# Patient Record
Sex: MALE | Race: WHITE | Employment: FULL TIME | URBAN - NONMETROPOLITAN AREA
[De-identification: names, ages, dates, MRNs, and addresses within clinical notes are randomized per-mention and may not be internally consistent; named-entity substitution may affect disease eponyms.]

---

## 2020-05-22 ENCOUNTER — HOSPITAL ENCOUNTER (OUTPATIENT)
Dept: MRI IMAGING | Facility: OTHER | Age: 35
End: 2020-05-22
Attending: CLINICAL NURSE SPECIALIST
Payer: COMMERCIAL

## 2020-05-22 DIAGNOSIS — R83.9 CSF ABNORMAL: ICD-10-CM

## 2020-05-22 PROCEDURE — 72156 MRI NECK SPINE W/O & W/DYE: CPT

## 2020-05-22 PROCEDURE — 70553 MRI BRAIN STEM W/O & W/DYE: CPT

## 2020-05-22 PROCEDURE — A9575 INJ GADOTERATE MEGLUMI 0.1ML: HCPCS | Performed by: RADIOLOGY

## 2020-05-22 PROCEDURE — 25500064 ZZH RX 255 OP 636: Performed by: RADIOLOGY

## 2020-05-22 RX ADMIN — GADOTERATE MEGLUMINE 20 ML: 376.9 INJECTION INTRAVENOUS at 09:16

## 2025-06-10 ENCOUNTER — OFFICE VISIT (OUTPATIENT)
Dept: FAMILY MEDICINE | Facility: OTHER | Age: 40
End: 2025-06-10
Attending: STUDENT IN AN ORGANIZED HEALTH CARE EDUCATION/TRAINING PROGRAM
Payer: COMMERCIAL

## 2025-06-10 ENCOUNTER — HOSPITAL ENCOUNTER (OUTPATIENT)
Dept: GENERAL RADIOLOGY | Facility: OTHER | Age: 40
Discharge: HOME | End: 2025-06-10
Payer: COMMERCIAL

## 2025-06-10 VITALS
HEIGHT: 70 IN | WEIGHT: 302 LBS | RESPIRATION RATE: 18 BRPM | HEART RATE: 82 BPM | SYSTOLIC BLOOD PRESSURE: 150 MMHG | TEMPERATURE: 98.5 F | DIASTOLIC BLOOD PRESSURE: 84 MMHG | BODY MASS INDEX: 43.23 KG/M2 | OXYGEN SATURATION: 96 %

## 2025-06-10 DIAGNOSIS — M79.671 PAIN OF RIGHT HEEL: ICD-10-CM

## 2025-06-10 DIAGNOSIS — M10.9 ACUTE GOUT OF RIGHT FOOT, UNSPECIFIED CAUSE: Primary | ICD-10-CM

## 2025-06-10 LAB
ALBUMIN SERPL BCG-MCNC: 4.5 G/DL (ref 3.5–5.2)
ALP SERPL-CCNC: 87 U/L (ref 40–150)
ALT SERPL W P-5'-P-CCNC: 56 U/L (ref 0–70)
ANION GAP SERPL CALCULATED.3IONS-SCNC: 11 MMOL/L (ref 7–15)
AST SERPL W P-5'-P-CCNC: 36 U/L (ref 0–45)
BASOPHILS # BLD AUTO: 0 10E3/UL (ref 0–0.2)
BASOPHILS NFR BLD AUTO: 0 %
BILIRUB SERPL-MCNC: 0.3 MG/DL
BUN SERPL-MCNC: 15.4 MG/DL (ref 6–20)
CALCIUM SERPL-MCNC: 8.8 MG/DL (ref 8.8–10.4)
CHLORIDE SERPL-SCNC: 103 MMOL/L (ref 98–107)
CREAT SERPL-MCNC: 1.2 MG/DL (ref 0.67–1.17)
CRP SERPL-MCNC: <3 MG/L
EGFRCR SERPLBLD CKD-EPI 2021: 79 ML/MIN/1.73M2
EOSINOPHIL # BLD AUTO: 0.2 10E3/UL (ref 0–0.7)
EOSINOPHIL NFR BLD AUTO: 2 %
ERYTHROCYTE [DISTWIDTH] IN BLOOD BY AUTOMATED COUNT: 12 % (ref 10–15)
GLUCOSE SERPL-MCNC: 101 MG/DL (ref 70–99)
HCO3 SERPL-SCNC: 25 MMOL/L (ref 22–29)
HCT VFR BLD AUTO: 42.4 % (ref 40–53)
HGB BLD-MCNC: 15.1 G/DL (ref 13.3–17.7)
HOLD SPECIMEN: NORMAL
IMM GRANULOCYTES # BLD: 0 10E3/UL
IMM GRANULOCYTES NFR BLD: 0 %
LYMPHOCYTES # BLD AUTO: 3.2 10E3/UL (ref 0.8–5.3)
LYMPHOCYTES NFR BLD AUTO: 34 %
MCH RBC QN AUTO: 29 PG (ref 26.5–33)
MCHC RBC AUTO-ENTMCNC: 35.6 G/DL (ref 31.5–36.5)
MCV RBC AUTO: 82 FL (ref 78–100)
MONOCYTES # BLD AUTO: 0.6 10E3/UL (ref 0–1.3)
MONOCYTES NFR BLD AUTO: 6 %
NEUTROPHILS # BLD AUTO: 5.3 10E3/UL (ref 1.6–8.3)
NEUTROPHILS NFR BLD AUTO: 57 %
NRBC # BLD AUTO: 0 10E3/UL
NRBC BLD AUTO-RTO: 0 /100
PLATELET # BLD AUTO: 298 10E3/UL (ref 150–450)
POTASSIUM SERPL-SCNC: 4.2 MMOL/L (ref 3.4–5.3)
PROT SERPL-MCNC: 6.8 G/DL (ref 6.4–8.3)
RBC # BLD AUTO: 5.2 10E6/UL (ref 4.4–5.9)
SODIUM SERPL-SCNC: 139 MMOL/L (ref 135–145)
URATE SERPL-MCNC: 7.1 MG/DL (ref 3.4–7)
WBC # BLD AUTO: 9.4 10E3/UL (ref 4–11)

## 2025-06-10 PROCEDURE — 85025 COMPLETE CBC W/AUTO DIFF WBC: CPT | Mod: ZL

## 2025-06-10 PROCEDURE — 84550 ASSAY OF BLOOD/URIC ACID: CPT | Mod: ZL

## 2025-06-10 PROCEDURE — 86140 C-REACTIVE PROTEIN: CPT | Mod: ZL

## 2025-06-10 PROCEDURE — 36415 COLL VENOUS BLD VENIPUNCTURE: CPT | Mod: ZL

## 2025-06-10 PROCEDURE — 80053 COMPREHEN METABOLIC PANEL: CPT | Mod: ZL

## 2025-06-10 PROCEDURE — 73650 X-RAY EXAM OF HEEL: CPT | Mod: RT

## 2025-06-10 PROCEDURE — 73650 X-RAY EXAM OF HEEL: CPT | Mod: 26 | Performed by: STUDENT IN AN ORGANIZED HEALTH CARE EDUCATION/TRAINING PROGRAM

## 2025-06-10 RX ORDER — PREDNISONE 20 MG/1
40 TABLET ORAL DAILY
Qty: 10 TABLET | Refills: 0 | Status: SHIPPED | OUTPATIENT
Start: 2025-06-10 | End: 2025-06-15

## 2025-06-10 ASSESSMENT — PAIN SCALES - GENERAL: PAINLEVEL_OUTOF10: MODERATE PAIN (6)

## 2025-06-10 NOTE — PROGRESS NOTES
ASSESSMENT/PLAN:    I have reviewed the nursing notes.  I have reviewed the findings, diagnosis, plan and need for follow up with the patient.    1. Acute gout of right foot, unspecified cause (Primary)  2. Pain of right heel  - XR Calcaneus Right G/E 2 Views  - CBC and Differential  - CRP inflammation  - Comprehensive Metabolic Panel  - Uric acid  - predniSONE (DELTASONE) 20 MG tablet; Take 2 tablets (40 mg) by mouth daily for 5 days.  Dispense: 10 tablet; Refill: 0    Patient presents with pain in his right heel with no known injury.  Right calcaneus x-ray indicates spurring of the dorsal calcaneus and midfoot spurring.  Labs are stable except uric acid is slightly elevated at 7.1.  Discussed results with patient in clinic.  Will treat for gout in the right foot with prednisone.  Advised patient take this medication in the morning with food and to avoid any NSAIDs.  May take Tylenol as needed.  May use ice.  Gave patient information on foods to avoid that are higher in purines.  Advised patient that if he continues to have pain that may be from the spurring to follow-up either with his PCP or may want to have a referral to orthopedics.    Discussed warning signs/symptoms indicative of need to f/u    Follow up if symptoms persist or worsen or concerns    I explained my diagnostic considerations and recommendations to the patient, who voiced understanding and agreement with the treatment plan. All questions were answered. We discussed potential side effects of any prescribed or recommended therapies, as well as expectations for response to treatments.    SUSANNAH Nazario CNP  6/10/2025  3:57 PM    HPI:    Alan Hamm is a 39 year old male who presents to [facility] today for concerns of right heel pain    Patient states that he has had right heel pain for the past week with no known injury.  He states that the pain is on his posterior heel near the insertion of his Achilles tendon.  He denies any  "erythema, ecchymosis, or edema.  He states that the pain is mostly present with ambulation.  He has taken ibuprofen.  He denies any numbness or tingling.  He denies any similar pain in the past.  He states he has had gout in his great toe in the past.    History reviewed. No pertinent past medical history.  History reviewed. No pertinent surgical history.  Social History     Tobacco Use    Smoking status: Never    Smokeless tobacco: Never   Substance Use Topics    Alcohol use: Yes     No current outpatient medications on file.     Allergies   Allergen Reactions    Other Environmental Allergy      Poison Ivy     Past medical history, past surgical history, current medications and allergies reviewed and accurate to the best of my knowledge.      ROS:  Refer to HPI    BP (!) 150/84   Pulse 82   Temp 98.5  F (36.9  C)   Resp 18   Ht 1.778 m (5' 10\")   Wt (!) 137 kg (302 lb)   SpO2 96%   BMI 43.33 kg/m      EXAM:  General Appearance: Well appearing 39 year old male, appropriate appearance for age. No acute distress   Musculoskeletal: Range of motion of right lower extremity intact, no skin discoloration noted on right heel, no tenderness with palpation of right heel or foot.  Negative syndesmotic stress test.  Negative Braden test.  Patient able to wiggle toes.  Sensation intact.  Dermatological: no rashes noted of exposed skin  Neuro: Alert and oriented to person, place, and time.   Psychological: normal affect, alert, oriented, and pleasant.     Labs & Xray:  Results for orders placed or performed in visit on 06/10/25   XR Calcaneus Right G/E 2 Views     Status: None    Narrative    Exam: XR CALCANEUS RIGHT G/E 2 VIEWS    Technique: Right calcaneus, 2 views    Comparison: None.    Exam reason: Pain of right heel    Findings:  No acute fracture. There is spurring of the dorsal calcaneus. There is  midfoot spurring.    No focal soft tissue pathology.      Impression    Impression:  No acute fracture or " dislocation.    MATT LAWRENCE MD         SYSTEM ID:  [id number]   CRP inflammation     Status: Normal   Result Value Ref Range    CRP Inflammation <3.00 <5.00 mg/L   Comprehensive Metabolic Panel     Status: Abnormal   Result Value Ref Range    Sodium 139 135 - 145 mmol/L    Potassium 4.2 3.4 - 5.3 mmol/L    Carbon Dioxide (CO2) 25 22 - 29 mmol/L    Anion Gap 11 7 - 15 mmol/L    Urea Nitrogen 15.4 6.0 - 20.0 mg/dL    Creatinine 1.20 (H) 0.67 - 1.17 mg/dL    GFR Estimate 79 >60 mL/min/1.73m2    Calcium 8.8 8.8 - 10.4 mg/dL    Chloride 103 98 - 107 mmol/L    Glucose 101 (H) 70 - 99 mg/dL    Alkaline Phosphatase 87 40 - 150 U/L    AST 36 0 - 45 U/L    ALT 56 0 - 70 U/L    Protein Total 6.8 6.4 - 8.3 g/dL    Albumin 4.5 3.5 - 5.2 g/dL    Bilirubin Total 0.3 <=1.2 mg/dL   Uric acid     Status: Abnormal   Result Value Ref Range    Uric Acid 7.1 (H) 3.4 - 7.0 mg/dL   CBC with platelets and differential     Status: None   Result Value Ref Range    WBC Count 9.4 4.0 - 11.0 10e3/uL    RBC Count 5.20 4.40 - 5.90 10e6/uL    Hemoglobin 15.1 13.3 - 17.7 g/dL    Hematocrit 42.4 40.0 - 53.0 %    MCV 82 78 - 100 fL    MCH 29.0 26.5 - 33.0 pg    MCHC 35.6 31.5 - 36.5 g/dL    RDW 12.0 10.0 - 15.0 %    Platelet Count 298 150 - 450 10e3/uL    % Neutrophils 57 %    % Lymphocytes 34 %    % Monocytes 6 %    % Eosinophils 2 %    % Basophils 0 %    % Immature Granulocytes 0 %    NRBCs per 100 WBC 0 <1 /100    Absolute Neutrophils 5.3 1.6 - 8.3 10e3/uL    Absolute Lymphocytes 3.2 0.8 - 5.3 10e3/uL    Absolute Monocytes 0.6 0.0 - 1.3 10e3/uL    Absolute Eosinophils 0.2 0.0 - 0.7 10e3/uL    Absolute Basophils 0.0 0.0 - 0.2 10e3/uL    Absolute Immature Granulocytes 0.0 <=0.4 10e3/uL    Absolute NRBCs 0.0 10e3/uL   Extra Tube     Status: None    Narrative    The following orders were created for panel order Extra Tube.  Procedure                               Abnormality         Status                     ---------                                -----------         ------                     Extra Red Top Tube[[id number]]                              Final result                 Please view results for these tests on the individual orders.   Extra Red Top Tube     Status: None   Result Value Ref Range    Hold Specimen x    CBC and Differential     Status: None    Narrative    The following orders were created for panel order CBC and Differential.  Procedure                               Abnormality         Status                     ---------                               -----------         ------                     CBC with platelets and ...[[id number]]                      Final result                 Please view results for these tests on the individual orders.

## 2025-06-10 NOTE — NURSING NOTE
Patient presents today for right heel pain that started 1 week ago. Patient didn't have an injury that caused the pain. Patient woke up with the pain in the morning last Thursday.    Medication Reconciliation Complete    Shanti Cortez LPN  6/10/2025 3:47 PM

## 2025-06-28 ENCOUNTER — HEALTH MAINTENANCE LETTER (OUTPATIENT)
Age: 40
End: 2025-06-28